# Patient Record
Sex: FEMALE | Race: WHITE
[De-identification: names, ages, dates, MRNs, and addresses within clinical notes are randomized per-mention and may not be internally consistent; named-entity substitution may affect disease eponyms.]

---

## 2017-06-08 ENCOUNTER — HOSPITAL ENCOUNTER (OUTPATIENT)
Dept: HOSPITAL 62 - RAD | Age: 27
End: 2017-06-08
Attending: FAMILY MEDICINE
Payer: COMMERCIAL

## 2017-06-08 DIAGNOSIS — R06.00: Primary | ICD-10-CM

## 2017-06-08 PROCEDURE — 71020: CPT

## 2017-06-08 NOTE — RADIOLOGY REPORT (SQ)
EXAM DESCRIPTION:  CHEST PA/LAT



COMPLETED DATE/TIME:  6/8/2017 2:07 pm



REASON FOR STUDY:  DYSPNEA (R06.00)



COMPARISON:  None.



EXAM PARAMETERS:  NUMBER OF VIEWS: two views

TECHNIQUE: Digital Frontal and Lateral radiographic views of the chest acquired.

RADIATION DOSE: NA

LIMITATIONS: none



FINDINGS:  LUNGS AND PLEURA: No opacities, masses or pneumothorax. No pleural effusion.

MEDIASTINUM AND HILAR STRUCTURES: No masses or contour abnormalities.

HEART AND VASCULAR STRUCTURES: Heart normal size.  No evidence for failure.

BONES: No acute findings.

HARDWARE: None in the chest.

OTHER: No other significant finding.



IMPRESSION:  NO SIGNIFICANT RADIOGRAPHIC FINDING IN THE CHEST.



TECHNICAL DOCUMENTATION:  JOB ID:  [id number]

 2011 [employer]- All Rights Reserved

## 2017-06-21 ENCOUNTER — HOSPITAL ENCOUNTER (OUTPATIENT)
Dept: HOSPITAL 62 - RAD | Age: 27
End: 2017-06-21
Attending: FAMILY MEDICINE
Payer: COMMERCIAL

## 2017-06-21 DIAGNOSIS — R13.10: Primary | ICD-10-CM

## 2017-06-21 PROCEDURE — 76536 US EXAM OF HEAD AND NECK: CPT

## 2017-06-21 NOTE — RADIOLOGY REPORT (SQ)
EXAM DESCRIPTION:  U/S THYROID/SFT TISS HD   NECK



COMPLETED DATE/TIME:  6/21/2017 9:31 am



REASON FOR STUDY:  TROUBLE SWALLOWING, POSSIBLE NECK SWELLING



COMPARISON:  None.



TECHNIQUE:  Dynamic and static gray-scale images acquired of the thyroid gland. Selected additional c
olor/power Doppler images recorded.  All images stored to PACS.



LIMITATIONS:  None.



FINDINGS:  RIGHT LOBE: Normal size, 5.2 cm in greatest length.  Homogeneous echotexture.  No cystic o
r solid masses.

LEFT LOBE: Normal size, 5.5 cm in greatest length.  Homogeneous echotexture.  No cystic or solid mass
es.

ISTHMUS: Normal size, 4.7 mm in greatest thickness.  Homogeneous echotexture.  No cystic or solid mas
ses.

OTHER: No other significant finding.



IMPRESSION:  NORMAL THYROID ULTRASOUND.



TECHNICAL DOCUMENTATION:  JOB ID:  [id number]

 2011 [employer]- All Rights Reserved

## 2018-03-28 ENCOUNTER — HOSPITAL ENCOUNTER (OUTPATIENT)
Dept: HOSPITAL 62 - OD | Age: 28
End: 2018-03-28
Attending: FAMILY MEDICINE
Payer: COMMERCIAL
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
## 2018-03-28 DIAGNOSIS — S92.912A: Primary | ICD-10-CM
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
## 2018-03-28 NOTE — RADIOLOGY REPORT (SQ)
EXAM DESCRIPTION:  FOOT LEFT COMPLETE



COMPLETED DATE/TIME:  3/28/2018 12:18 pm



REASON FOR STUDY:  UNSP FRACTURE OF LEFT TOE(S), INIT FOR CLOS FX S92.912A  UNSP FRACTURE OF LEFT TOE
(S), INIT FOR CLOS FX



COMPARISON:  4th toe films same date



NUMBER OF VIEWS:  Three views.



TECHNIQUE:  AP, lateral and oblique  radiographic images acquired of the left foot.



LIMITATIONS:  None.



FINDINGS:  MINERALIZATION: Normal.

BONES: No acute fracture or dislocation.  No worrisome bone lesions.

JOINTS: No effusions.

SOFT TISSUES: No soft tissue swelling.  No foreign body.

OTHER: No other significant finding.



IMPRESSION:  NEGATIVE STUDY OF THE LEFT FOOT. NO RADIOGRAPHIC EVIDENCE OF ACUTE INJURY.



TECHNICAL DOCUMENTATION:  JOB ID:  [id number]

 2011 [employer]- All Rights Reserved



Reading location - IP/workstation name: [email or web address]

## 2018-03-28 NOTE — RADIOLOGY REPORT (SQ)
EXAM DESCRIPTION:  TOES LEFT



COMPLETED DATE/TIME:  3/28/2018 12:18 pm



REASON FOR STUDY:  UNSP FRACTURE OF LEFT TOE(S), INIT FOR CLOS FX S92.912A  UNSP FRACTURE OF LEFT TOE
(S), INIT FOR CLOS FX



COMPARISON:  None.



NUMBER OF VIEWS:  Three views.



TECHNIQUE:  AP, lateral, and oblique images acquired of the left fourth toe.



LIMITATIONS:  None.



FINDINGS:  MINERALIZATION: Normal.

BONES: No acute fracture or dislocation.  No worrisome bone lesions.

JOINTS: No effusions.

SOFT TISSUES: No soft tissue swelling.  No foreign body.

OTHER: No other significant finding.



IMPRESSION:  No acute fracture or malalignment left 4th toe



COMMENT:  SITE OF TRAUMA/COMPLAINT MARKED/STAMP COMPLETED: Yes



TECHNICAL DOCUMENTATION:  JOB ID:  [id number]

 2011 [employer]- All Rights Reserved



Reading location - IP/workstation name: [email or web address]

## 2020-02-10 ENCOUNTER — HOSPITAL ENCOUNTER (OUTPATIENT)
Dept: HOSPITAL 62 - WI | Age: 30
End: 2020-02-10
Attending: ADVANCED PRACTICE MIDWIFE
Payer: COMMERCIAL

## 2020-02-10 DIAGNOSIS — N63.10: Primary | ICD-10-CM

## 2020-02-10 PROCEDURE — 76642 ULTRASOUND BREAST LIMITED: CPT

## 2020-02-10 NOTE — WOMENS IMAGING REPORT
EXAM DESCRIPTION:  3D DIAG MAMMO BILAT NO CHG; U/S BREAST UNILAT LIMITED



COMPLETED DATE/TIME:  2/10/2020 9:15 am; 2/10/2020 9:58 am



REASON FOR STUDY:  N63.10 UNSPECIFIED LUMP IN THE RIGHT BREAST, UNSPECIFIED QUADRANT; RT BREAST THINK
ENING N63.10 N63.10  UNSPECIFIED LUMP IN THE RIGHT BREAST, UNSPECIFIED YOSEF



COMPARISON:  No previous



EXAM PARAMETERS:  Standard craniocaudal and mediolateral oblique views of each breast recorded using 
digital acquisition and breast tomosynthesis.

Additional right breast 90 mediolateral mammogram and tomosynthesis.

Right breast ultrasound was also performed in the area of palpable abnormality 12 o'clock position ri
t breast

Read with the assistance of CAD:

.[employer]  Version 9.2



LIMITATIONS:  None.



FINDINGS:  RIGHT BREAST

MASSES: No suspicious masses.

CALCIFICATIONS: No new or suspicious calcifications.

ARCHITECTURAL DISTORTION: None.

ASYMMETRY: None noted.

OTHER: No other significant findings.

LEFT BREAST

MASSES: No suspicious masses.

CALCIFICATIONS: No new or suspicious calcifications.

ARCHITECTURAL DISTORTION: None.

ASYMMETRY: None noted.

OTHER: No other significant finding.

Right breast ultrasound:

Ultrasound of the right breast was performed in the area of palpable abnormality indicated by the pat
ient.

No masses.  No dilated ducts.  No cysts.  No worrisome acoustic absorption.  No focal findings.



IMPRESSION:  No mammographic or sonographic evidence for malignancy right breast.  No mammographic ev
idence for malignancy left breast.



BREAST DENSITY:  c. The breasts are heterogeneously dense, which may obscure small masses.



BIRAD:  ASSESSMENT:  1 Negative.



RECOMMENDATION:  RECOMMENDED FOLLOW UP: Please continue yearly bilateral screening mammography based 
on patient's lifetime risk assessment for breast cancer using the Yasmeen or UMA model

SPECIFIC INTERVENTION/IMAGING/CONSULTATION RECOMMENDED:No additional intervention/ imaging/consultati
on needed at this time.

COMMUNICATION:The negative/benign results were communicated to the patient.



COMMENT:  The patient has been notified of the results by letter per MQSA requirements. Additional no
tification policies are in place for contacting patient with suspicious or incomplete findings.

Quality ID #225: The [employer] College of [employer]iology recommends an annual screening mammogram for women
 aged 40 years or over. This facility utilizes a reminder system to ensure that all patients receive 
reminder letters, and/or direct phone calls for appointments. This includes reminders for routine scr
eening mammograms, diagnostic mammograms, or other Breast Imaging Interventions when appropriate.  Th
is patient will be placed in the appropriate reminder system.



TECHNICAL DOCUMENTATION:  FINDING NUMBER: (1)

ASSESSMENT: (1)

JOB ID:  [id number]

 2011 "[employer]- All Rights Reserved



Reading location - IP/workstation name: [email or web address]

## 2020-10-07 ENCOUNTER — HOSPITAL ENCOUNTER (OUTPATIENT)
Dept: HOSPITAL 62 - OD | Age: 30
End: 2020-10-07
Attending: FAMILY MEDICINE
Payer: COMMERCIAL

## 2020-10-07 DIAGNOSIS — E66.3: ICD-10-CM

## 2020-10-07 DIAGNOSIS — Z79.899: ICD-10-CM

## 2020-10-07 DIAGNOSIS — L50.1: ICD-10-CM

## 2020-10-07 DIAGNOSIS — J30.9: Primary | ICD-10-CM

## 2020-10-07 LAB
ADD MANUAL DIFF: NO
ALBUMIN SERPL-MCNC: 4.7 G/DL (ref 3.5–5)
ALP SERPL-CCNC: 47 U/L (ref 38–126)
ANION GAP SERPL CALC-SCNC: 10 MMOL/L (ref 5–19)
AST SERPL-CCNC: 27 U/L (ref 14–36)
BASOPHILS # BLD AUTO: 0.1 10^3/UL (ref 0–0.2)
BASOPHILS NFR BLD AUTO: 1.1 % (ref 0–2)
BILIRUB DIRECT SERPL-MCNC: 0.3 MG/DL (ref 0–0.4)
BILIRUB SERPL-MCNC: 0.6 MG/DL (ref 0.2–1.3)
BUN SERPL-MCNC: 15 MG/DL (ref 7–20)
CALCIUM: 9.9 MG/DL (ref 8.4–10.2)
CHLORIDE SERPL-SCNC: 103 MMOL/L (ref 98–107)
CHOLEST SERPL-MCNC: 179.22 MG/DL (ref 0–200)
CO2 SERPL-SCNC: 26 MMOL/L (ref 22–30)
EOSINOPHIL # BLD AUTO: 0.2 10^3/UL (ref 0–0.6)
EOSINOPHIL NFR BLD AUTO: 2.4 % (ref 0–6)
ERYTHROCYTE [DISTWIDTH] IN BLOOD BY AUTOMATED COUNT: 12.9 % (ref 11.5–14)
GLUCOSE SERPL-MCNC: 88 MG/DL (ref 75–110)
HCT VFR BLD CALC: 41.4 % (ref 36–47)
HGB BLD-MCNC: 14.3 G/DL (ref 12–15.5)
LDLC SERPL DIRECT ASSAY-MCNC: 53 MG/DL (ref ?–100)
LYMPHOCYTES # BLD AUTO: 2.3 10^3/UL (ref 0.5–4.7)
LYMPHOCYTES NFR BLD AUTO: 35.8 % (ref 13–45)
MCH RBC QN AUTO: 32.4 PG (ref 27–33.4)
MCHC RBC AUTO-ENTMCNC: 34.6 G/DL (ref 32–36)
MCV RBC AUTO: 94 FL (ref 80–97)
MONOCYTES # BLD AUTO: 0.4 10^3/UL (ref 0.1–1.4)
MONOCYTES NFR BLD AUTO: 6 % (ref 3–13)
NEUTROPHILS # BLD AUTO: 3.5 10^3/UL (ref 1.7–8.2)
NEUTS SEG NFR BLD AUTO: 54.7 % (ref 42–78)
PLATELET # BLD: 226 10^3/UL (ref 150–450)
POTASSIUM SERPL-SCNC: 4.2 MMOL/L (ref 3.6–5)
PROT SERPL-MCNC: 7.2 G/DL (ref 6.3–8.2)
RBC # BLD AUTO: 4.42 10^6/UL (ref 3.72–5.28)
TOTAL CELLS COUNTED % (AUTO): 100 %
TRIGL SERPL-MCNC: 89 MG/DL (ref ?–150)
VLDLC SERPL CALC-MCNC: 18 MG/DL (ref 10–31)
WBC # BLD AUTO: 6.4 10^3/UL (ref 4–10.5)

## 2020-10-07 PROCEDURE — 36415 COLL VENOUS BLD VENIPUNCTURE: CPT

## 2020-10-07 PROCEDURE — 80053 COMPREHEN METABOLIC PANEL: CPT

## 2020-10-07 PROCEDURE — 84443 ASSAY THYROID STIM HORMONE: CPT

## 2020-10-07 PROCEDURE — 80061 LIPID PANEL: CPT

## 2020-10-07 PROCEDURE — 85025 COMPLETE CBC W/AUTO DIFF WBC: CPT
